# Patient Record
Sex: FEMALE | Race: WHITE | NOT HISPANIC OR LATINO | Employment: FULL TIME | URBAN - METROPOLITAN AREA
[De-identification: names, ages, dates, MRNs, and addresses within clinical notes are randomized per-mention and may not be internally consistent; named-entity substitution may affect disease eponyms.]

---

## 2022-05-26 ENCOUNTER — OFFICE VISIT (OUTPATIENT)
Dept: URGENT CARE | Facility: URGENT CARE | Age: 47
End: 2022-05-26
Payer: COMMERCIAL

## 2022-05-26 VITALS
SYSTOLIC BLOOD PRESSURE: 106 MMHG | DIASTOLIC BLOOD PRESSURE: 71 MMHG | RESPIRATION RATE: 18 BRPM | WEIGHT: 165 LBS | HEART RATE: 82 BPM | OXYGEN SATURATION: 100 % | HEIGHT: 66 IN | BODY MASS INDEX: 26.52 KG/M2 | TEMPERATURE: 98.2 F

## 2022-05-26 DIAGNOSIS — J20.9 ACUTE BRONCHITIS WITH SYMPTOMS > 10 DAYS: Primary | ICD-10-CM

## 2022-05-26 PROCEDURE — 99203 OFFICE O/P NEW LOW 30 MIN: CPT | Performed by: PHYSICIAN ASSISTANT

## 2022-05-26 RX ORDER — ALBUTEROL SULFATE 90 UG/1
2 AEROSOL, METERED RESPIRATORY (INHALATION) EVERY 6 HOURS
Qty: 18 G | Refills: 0 | Status: SHIPPED | OUTPATIENT
Start: 2022-05-26

## 2022-05-26 RX ORDER — BENZONATATE 100 MG/1
100 CAPSULE ORAL 3 TIMES DAILY PRN
Qty: 30 CAPSULE | Refills: 0 | Status: SHIPPED | OUTPATIENT
Start: 2022-05-26

## 2022-05-26 RX ORDER — AZITHROMYCIN 250 MG/1
TABLET, FILM COATED ORAL
Qty: 6 TABLET | Refills: 0 | Status: SHIPPED | OUTPATIENT
Start: 2022-05-26 | End: 2022-05-31

## 2022-05-26 RX ORDER — PREDNISONE 20 MG/1
20 TABLET ORAL 2 TIMES DAILY
Qty: 10 TABLET | Refills: 0 | Status: SHIPPED | OUTPATIENT
Start: 2022-05-26 | End: 2022-05-31

## 2022-05-26 ASSESSMENT — ENCOUNTER SYMPTOMS
FATIGUE: 1
SINUS PAIN: 1
NEUROLOGICAL NEGATIVE: 1
SINUS PRESSURE: 1
COUGH: 1
PSYCHIATRIC NEGATIVE: 1

## 2022-05-26 NOTE — PROGRESS NOTES
Assessment & Plan     Acute bronchitis with symptoms > 10 days  - predniSONE (DELTASONE) 20 MG tablet  Dispense: 10 tablet; Refill: 0  - albuterol (PROAIR HFA/PROVENTIL HFA/VENTOLIN HFA) 108 (90 Base) MCG/ACT inhaler  Dispense: 18 g; Refill: 0  - benzonatate (TESSALON) 100 MG capsule  Dispense: 30 capsule; Refill: 0  - azithromycin (ZITHROMAX) 250 MG tablet  Dispense: 6 tablet; Refill: 0     Instructed to take medication as directed. Increase fluids with water, Pedialyte, Gatorade, or rehydrating beverages. Alternate Tylenol and Ibuprofen as needed for aches, pains or fever. Follow clear liquid to BRAT diet (bananas, rice, applesauce, toast) as needed for any upset stomach. Rest as much as possible. Use OTC cough and cold medication. Run humidifier at night. Follow up in clinic if symptoms persist or worsen.       Return in about 1 week (around 6/2/2022), or if symptoms worsen or fail to improve.    Austin Stevens is a 46 year old female who presents to clinic today for the following health issues:  Chief Complaint   Patient presents with     Urgent Care     Cold Symptoms     X2 months.     Cough     x1 week cough with phlegm, sinus infection. E-visit was prescribed with meds but not getting better.     Hilda presents with reports of sinus pain and pressure x 2 months as well as cough that is productive x 1 month. She had an evisit and was prescribed Azithromycin, then Amoxicillin. Cough is keeping her up. She denies seasonal allergies. She has tried Flonase which offered minimal relief and made her chest feel weird. She also tried Allegra which offered no relief. She has done 3 COVID tests this week and all were negative. She denies history of asthma, smoking. She reports once a year she will get some congestion and a sinus infection but states this is exceptionally long.           Review of Systems   Constitutional: Positive for fatigue.   HENT: Positive for congestion, sinus pressure and sinus pain.   "  Respiratory: Positive for cough.    Neurological: Negative.    Psychiatric/Behavioral: Negative.            Objective    /71   Pulse 82   Temp 98.2  F (36.8  C) (Temporal)   Resp 18   Ht 1.664 m (5' 5.5\")   Wt 74.8 kg (165 lb)   SpO2 100%   BMI 27.04 kg/m    Physical Exam  Constitutional:       Appearance: Normal appearance.   HENT:      Head: Normocephalic and atraumatic.      Right Ear: Tympanic membrane, ear canal and external ear normal.      Left Ear: Tympanic membrane, ear canal and external ear normal.      Nose: Congestion present.      Mouth/Throat:      Mouth: Mucous membranes are moist.      Pharynx: Oropharynx is clear.   Eyes:      Extraocular Movements: Extraocular movements intact.      Conjunctiva/sclera: Conjunctivae normal.      Pupils: Pupils are equal, round, and reactive to light.   Cardiovascular:      Rate and Rhythm: Normal rate and regular rhythm.      Heart sounds: Normal heart sounds.   Pulmonary:      Effort: Pulmonary effort is normal.      Breath sounds: Normal breath sounds.   Musculoskeletal:         General: Normal range of motion.      Cervical back: Normal range of motion and neck supple.   Skin:     General: Skin is warm and dry.   Neurological:      General: No focal deficit present.      Mental Status: She is alert and oriented to person, place, and time.   Psychiatric:         Mood and Affect: Mood normal.         Behavior: Behavior normal.         Thought Content: Thought content normal.         Judgment: Judgment normal.              Amor Alberts PA-C    "